# Patient Record
Sex: MALE | Race: WHITE | NOT HISPANIC OR LATINO | Employment: FULL TIME | URBAN - METROPOLITAN AREA
[De-identification: names, ages, dates, MRNs, and addresses within clinical notes are randomized per-mention and may not be internally consistent; named-entity substitution may affect disease eponyms.]

---

## 2020-02-21 ENCOUNTER — WALK IN (OUTPATIENT)
Dept: URGENT CARE | Age: 50
End: 2020-02-21

## 2020-02-21 ENCOUNTER — IMAGING SERVICES (OUTPATIENT)
Dept: GENERAL RADIOLOGY | Age: 50
End: 2020-02-21
Attending: FAMILY MEDICINE

## 2020-02-21 VITALS
TEMPERATURE: 97 F | RESPIRATION RATE: 20 BRPM | WEIGHT: 275 LBS | OXYGEN SATURATION: 97 % | DIASTOLIC BLOOD PRESSURE: 80 MMHG | HEIGHT: 70 IN | SYSTOLIC BLOOD PRESSURE: 122 MMHG | BODY MASS INDEX: 39.37 KG/M2 | HEART RATE: 77 BPM

## 2020-02-21 DIAGNOSIS — M79.671 RIGHT FOOT PAIN: ICD-10-CM

## 2020-02-21 DIAGNOSIS — S92.901A CLOSED FRACTURE OF RIGHT FOOT, INITIAL ENCOUNTER: Primary | ICD-10-CM

## 2020-02-21 PROCEDURE — 73630 X-RAY EXAM OF FOOT: CPT | Performed by: RADIOLOGY

## 2020-02-21 PROCEDURE — 99214 OFFICE O/P EST MOD 30 MIN: CPT | Performed by: FAMILY MEDICINE

## 2020-02-21 ASSESSMENT — ENCOUNTER SYMPTOMS
COLOR CHANGE: 0
NAUSEA: 0
CHILLS: 0
HEADACHES: 0
VOMITING: 0
DIZZINESS: 0
FEVER: 0
WOUND: 0

## 2020-02-24 ENCOUNTER — TELEPHONE (OUTPATIENT)
Dept: PODIATRY | Age: 50
End: 2020-02-24

## 2020-03-04 ENCOUNTER — OFFICE VISIT (OUTPATIENT)
Dept: PODIATRY | Age: 50
End: 2020-03-04
Attending: FAMILY MEDICINE

## 2020-03-04 VITALS — RESPIRATION RATE: 18 BRPM | DIASTOLIC BLOOD PRESSURE: 77 MMHG | SYSTOLIC BLOOD PRESSURE: 122 MMHG | HEART RATE: 78 BPM

## 2020-03-04 DIAGNOSIS — R26.9 ABNORMAL GAIT: ICD-10-CM

## 2020-03-04 DIAGNOSIS — S92.324G CLOSED NONDISPLACED FRACTURE OF SECOND METATARSAL BONE OF RIGHT FOOT WITH DELAYED HEALING, SUBSEQUENT ENCOUNTER: Primary | ICD-10-CM

## 2020-03-04 PROCEDURE — 99202 OFFICE O/P NEW SF 15 MIN: CPT | Performed by: PODIATRIST

## 2020-03-23 ENCOUNTER — HOME CARE DME RT ONLY (OUTPATIENT)
Dept: PODIATRY | Age: 50
End: 2020-03-23

## 2020-05-05 ENCOUNTER — NURSE TRIAGE (OUTPATIENT)
Dept: TELEHEALTH | Age: 50
End: 2020-05-05

## 2020-05-06 ENCOUNTER — WALK IN (OUTPATIENT)
Dept: URGENT CARE | Age: 50
End: 2020-05-06

## 2020-05-06 VITALS
HEIGHT: 70 IN | TEMPERATURE: 98.6 F | SYSTOLIC BLOOD PRESSURE: 136 MMHG | OXYGEN SATURATION: 97 % | WEIGHT: 276.4 LBS | BODY MASS INDEX: 39.57 KG/M2 | RESPIRATION RATE: 16 BRPM | HEART RATE: 91 BPM | DIASTOLIC BLOOD PRESSURE: 88 MMHG

## 2020-05-06 DIAGNOSIS — L03.116 CELLULITIS OF LEFT LEG: Primary | ICD-10-CM

## 2020-05-06 LAB
ALBUMIN SERPL-MCNC: 3.2 G/DL (ref 3.6–5.1)
ALBUMIN/GLOB SERPL: 0.6 {RATIO} (ref 1–2.4)
ALP SERPL-CCNC: 59 UNITS/L (ref 45–117)
ALT SERPL-CCNC: 31 UNITS/L
ANION GAP SERPL CALC-SCNC: 16 MMOL/L (ref 10–20)
AST SERPL-CCNC: 18 UNITS/L
BASOPHILS # BLD: 0 K/MCL (ref 0–0.3)
BASOPHILS NFR BLD: 0 %
BILIRUB SERPL-MCNC: 0.5 MG/DL (ref 0.2–1)
BUN SERPL-MCNC: 18 MG/DL (ref 6–20)
BUN/CREAT SERPL: 21 (ref 7–25)
CALCIUM SERPL-MCNC: 9.3 MG/DL (ref 8.4–10.2)
CHLORIDE SERPL-SCNC: 100 MMOL/L (ref 98–107)
CO2 SERPL-SCNC: 24 MMOL/L (ref 21–32)
CREAT SERPL-MCNC: 0.86 MG/DL (ref 0.67–1.17)
DEPRECATED RDW RBC: 45.4 FL (ref 39–50)
EOSINOPHIL # BLD: 0.1 K/MCL (ref 0.1–0.5)
EOSINOPHIL NFR BLD: 1 %
ERYTHROCYTE [DISTWIDTH] IN BLOOD: 14.8 % (ref 11–15)
FASTING DURATION TIME PATIENT: ABNORMAL H
GFR SERPLBLD BASED ON 1.73 SQ M-ARVRAT: >90 ML/MIN
GLOBULIN SER-MCNC: 5 G/DL (ref 2–4)
GLUCOSE SERPL-MCNC: 95 MG/DL (ref 65–99)
HCT VFR BLD CALC: 42.4 % (ref 39–51)
HGB BLD-MCNC: 14.2 G/DL (ref 13–17)
IMM GRANULOCYTES # BLD AUTO: 0.1 K/MCL (ref 0–0.2)
IMM GRANULOCYTES # BLD: 1 %
LYMPHOCYTES # BLD: 2.1 K/MCL (ref 1–4.8)
LYMPHOCYTES NFR BLD: 17 %
MCH RBC QN AUTO: 28.3 PG (ref 26–34)
MCHC RBC AUTO-ENTMCNC: 33.5 G/DL (ref 32–36.5)
MCV RBC AUTO: 84.5 FL (ref 78–100)
MONOCYTES # BLD: 1.8 K/MCL (ref 0.3–0.9)
MONOCYTES NFR BLD: 14 %
NEUTROPHILS # BLD: 8.2 K/MCL (ref 1.8–7.7)
NEUTROPHILS NFR BLD: 67 %
NRBC BLD MANUAL-RTO: 0 /100 WBC
PLATELET # BLD AUTO: 221 K/MCL (ref 140–450)
POTASSIUM SERPL-SCNC: 3.8 MMOL/L (ref 3.4–5.1)
PROT SERPL-MCNC: 8.2 G/DL (ref 6.4–8.2)
RBC # BLD: 5.02 MIL/MCL (ref 4.5–5.9)
SODIUM SERPL-SCNC: 136 MMOL/L (ref 135–145)
WBC # BLD: 12.3 K/MCL (ref 4.2–11)

## 2020-05-06 PROCEDURE — 80053 COMPREHEN METABOLIC PANEL: CPT | Performed by: INTERNAL MEDICINE

## 2020-05-06 PROCEDURE — 99214 OFFICE O/P EST MOD 30 MIN: CPT | Performed by: PHYSICIAN ASSISTANT

## 2020-05-06 PROCEDURE — 85025 COMPLETE CBC W/AUTO DIFF WBC: CPT | Performed by: INTERNAL MEDICINE

## 2020-05-06 RX ORDER — CLINDAMYCIN HYDROCHLORIDE 300 MG/1
300 CAPSULE ORAL 4 TIMES DAILY
Qty: 40 CAPSULE | Refills: 0 | Status: SHIPPED | OUTPATIENT
Start: 2020-05-06 | End: 2020-05-06 | Stop reason: SDUPTHER

## 2020-05-06 RX ORDER — CLINDAMYCIN HYDROCHLORIDE 300 MG/1
300 CAPSULE ORAL 4 TIMES DAILY
Qty: 40 CAPSULE | Refills: 0 | Status: SHIPPED | OUTPATIENT
Start: 2020-05-06 | End: 2020-05-16

## 2020-05-11 ENCOUNTER — WALK IN (OUTPATIENT)
Dept: URGENT CARE | Age: 50
End: 2020-05-11

## 2020-05-11 VITALS
TEMPERATURE: 97.6 F | WEIGHT: 276 LBS | HEART RATE: 77 BPM | RESPIRATION RATE: 18 BRPM | DIASTOLIC BLOOD PRESSURE: 70 MMHG | SYSTOLIC BLOOD PRESSURE: 108 MMHG | OXYGEN SATURATION: 97 % | BODY MASS INDEX: 39.6 KG/M2

## 2020-05-11 DIAGNOSIS — L03.116 CELLULITIS OF LEFT LOWER EXTREMITY: Primary | ICD-10-CM

## 2020-05-11 PROCEDURE — 99213 OFFICE O/P EST LOW 20 MIN: CPT | Performed by: FAMILY MEDICINE

## 2020-05-11 ASSESSMENT — ENCOUNTER SYMPTOMS
CHILLS: 0
DIZZINESS: 0
VOMITING: 0
FEVER: 0
NAUSEA: 0
HEADACHES: 0
WOUND: 0
COLOR CHANGE: 1

## 2020-05-15 ENCOUNTER — WALK IN (OUTPATIENT)
Dept: URGENT CARE | Age: 50
End: 2020-05-15

## 2020-05-15 ENCOUNTER — TELEPHONE (OUTPATIENT)
Dept: FAMILY MEDICINE | Age: 50
End: 2020-05-15

## 2020-05-15 VITALS
DIASTOLIC BLOOD PRESSURE: 70 MMHG | HEIGHT: 70 IN | TEMPERATURE: 99.1 F | BODY MASS INDEX: 39.26 KG/M2 | WEIGHT: 274.25 LBS | SYSTOLIC BLOOD PRESSURE: 110 MMHG | OXYGEN SATURATION: 97 % | HEART RATE: 84 BPM | RESPIRATION RATE: 16 BRPM

## 2020-05-15 DIAGNOSIS — L27.0 DRUG-INDUCED SKIN RASH: ICD-10-CM

## 2020-05-15 DIAGNOSIS — R21 SKIN RASH: Primary | ICD-10-CM

## 2020-05-15 PROCEDURE — 99214 OFFICE O/P EST MOD 30 MIN: CPT | Performed by: FAMILY MEDICINE

## 2020-05-15 RX ORDER — LISINOPRIL 10 MG/1
10 TABLET ORAL DAILY
COMMUNITY
End: 2020-07-07 | Stop reason: SDUPTHER

## 2020-05-15 RX ORDER — PREDNISONE 20 MG/1
TABLET ORAL
Qty: 20 TABLET | Refills: 0 | Status: SHIPPED | OUTPATIENT
Start: 2020-05-15 | End: 2020-06-03 | Stop reason: ALTCHOICE

## 2020-05-15 ASSESSMENT — ENCOUNTER SYMPTOMS
FEVER: 0
ABDOMINAL PAIN: 0
NAUSEA: 0
SHORTNESS OF BREATH: 0
HEADACHES: 0
VOMITING: 0
CHEST TIGHTNESS: 0
DIZZINESS: 0
CHILLS: 0
CONFUSION: 0

## 2020-05-19 ENCOUNTER — OFFICE VISIT (OUTPATIENT)
Dept: FAMILY MEDICINE | Age: 50
End: 2020-05-19

## 2020-05-19 VITALS
SYSTOLIC BLOOD PRESSURE: 117 MMHG | TEMPERATURE: 98.1 F | RESPIRATION RATE: 16 BRPM | HEART RATE: 75 BPM | HEIGHT: 70 IN | DIASTOLIC BLOOD PRESSURE: 80 MMHG | WEIGHT: 277 LBS | BODY MASS INDEX: 39.65 KG/M2

## 2020-05-19 DIAGNOSIS — E78.5 HYPERLIPIDEMIA, UNSPECIFIED HYPERLIPIDEMIA TYPE: ICD-10-CM

## 2020-05-19 DIAGNOSIS — I10 ESSENTIAL HYPERTENSION: ICD-10-CM

## 2020-05-19 DIAGNOSIS — Z76.89 ESTABLISHING CARE WITH NEW DOCTOR, ENCOUNTER FOR: ICD-10-CM

## 2020-05-19 DIAGNOSIS — L03.116 CELLULITIS OF LEFT LOWER EXTREMITY: Primary | ICD-10-CM

## 2020-05-19 DIAGNOSIS — Z88.9 DRUG ALLERGY: ICD-10-CM

## 2020-05-19 DIAGNOSIS — E11.9 TYPE 2 DIABETES MELLITUS WITHOUT COMPLICATION, WITHOUT LONG-TERM CURRENT USE OF INSULIN (CMD): ICD-10-CM

## 2020-05-19 PROCEDURE — 99214 OFFICE O/P EST MOD 30 MIN: CPT | Performed by: FAMILY MEDICINE

## 2020-05-19 ASSESSMENT — ENCOUNTER SYMPTOMS
ENDOCRINE NEGATIVE: 1
BACK PAIN: 0
SLEEP DISTURBANCE: 0
GASTROINTESTINAL NEGATIVE: 1
FEVER: 0
NERVOUS/ANXIOUS: 0
LIGHT-HEADEDNESS: 0
FATIGUE: 0
HEADACHES: 0
COUGH: 0
SHORTNESS OF BREATH: 0
CHILLS: 0
PHOTOPHOBIA: 0
WOUND: 1
DIZZINESS: 0

## 2020-06-03 ENCOUNTER — LAB SERVICES (OUTPATIENT)
Dept: LAB | Age: 50
End: 2020-06-03

## 2020-06-03 ENCOUNTER — TELEPHONE (OUTPATIENT)
Dept: FAMILY MEDICINE | Age: 50
End: 2020-06-03

## 2020-06-03 ENCOUNTER — OFFICE VISIT (OUTPATIENT)
Dept: FAMILY MEDICINE | Age: 50
End: 2020-06-03

## 2020-06-03 VITALS
OXYGEN SATURATION: 99 % | DIASTOLIC BLOOD PRESSURE: 70 MMHG | SYSTOLIC BLOOD PRESSURE: 98 MMHG | RESPIRATION RATE: 16 BRPM | HEIGHT: 70 IN | WEIGHT: 276.9 LBS | HEART RATE: 74 BPM | BODY MASS INDEX: 39.64 KG/M2

## 2020-06-03 DIAGNOSIS — L03.116 CELLULITIS OF LEFT LOWER EXTREMITY: Primary | ICD-10-CM

## 2020-06-03 DIAGNOSIS — E11.9 TYPE 2 DIABETES MELLITUS WITHOUT COMPLICATION, WITHOUT LONG-TERM CURRENT USE OF INSULIN (CMD): ICD-10-CM

## 2020-06-03 PROCEDURE — 80061 LIPID PANEL: CPT | Performed by: INTERNAL MEDICINE

## 2020-06-03 PROCEDURE — 99213 OFFICE O/P EST LOW 20 MIN: CPT | Performed by: FAMILY MEDICINE

## 2020-06-03 PROCEDURE — 82570 ASSAY OF URINE CREATININE: CPT | Performed by: INTERNAL MEDICINE

## 2020-06-03 PROCEDURE — 83036 HEMOGLOBIN GLYCOSYLATED A1C: CPT | Performed by: INTERNAL MEDICINE

## 2020-06-03 PROCEDURE — 80053 COMPREHEN METABOLIC PANEL: CPT | Performed by: INTERNAL MEDICINE

## 2020-06-03 PROCEDURE — 82043 UR ALBUMIN QUANTITATIVE: CPT | Performed by: INTERNAL MEDICINE

## 2020-06-03 RX ORDER — CEPHALEXIN 750 MG/1
750 CAPSULE ORAL 3 TIMES DAILY
Qty: 30 CAPSULE | Refills: 0 | Status: SHIPPED | OUTPATIENT
Start: 2020-06-03 | End: 2020-06-13

## 2020-06-03 ASSESSMENT — PATIENT HEALTH QUESTIONNAIRE - PHQ9
CLINICAL INTERPRETATION OF PHQ9 SCORE: NO FURTHER SCREENING NEEDED
2. FEELING DOWN, DEPRESSED OR HOPELESS: NOT AT ALL
SUM OF ALL RESPONSES TO PHQ9 QUESTIONS 1 AND 2: 0
1. LITTLE INTEREST OR PLEASURE IN DOING THINGS: NOT AT ALL
CLINICAL INTERPRETATION OF PHQ2 SCORE: NO FURTHER SCREENING NEEDED
SUM OF ALL RESPONSES TO PHQ9 QUESTIONS 1 AND 2: 0

## 2020-06-04 ENCOUNTER — TELEPHONE (OUTPATIENT)
Dept: FAMILY MEDICINE | Age: 50
End: 2020-06-04

## 2020-06-04 LAB
ALBUMIN SERPL-MCNC: 3.3 G/DL (ref 3.6–5.1)
ALBUMIN/GLOB SERPL: 0.9 {RATIO} (ref 1–2.4)
ALP SERPL-CCNC: 56 UNITS/L (ref 45–117)
ALT SERPL-CCNC: 35 UNITS/L
ANION GAP SERPL CALC-SCNC: 12 MMOL/L (ref 10–20)
AST SERPL-CCNC: 12 UNITS/L
BILIRUB SERPL-MCNC: 0.4 MG/DL (ref 0.2–1)
BUN SERPL-MCNC: 13 MG/DL (ref 6–20)
BUN/CREAT SERPL: 18 (ref 7–25)
CALCIUM SERPL-MCNC: 8.8 MG/DL (ref 8.4–10.2)
CHLORIDE SERPL-SCNC: 110 MMOL/L (ref 98–107)
CHOLEST SERPL-MCNC: 213 MG/DL
CHOLEST/HDLC SERPL: 5.6 {RATIO}
CO2 SERPL-SCNC: 21 MMOL/L (ref 21–32)
CREAT SERPL-MCNC: 0.73 MG/DL (ref 0.67–1.17)
CREAT UR-MCNC: 148 MG/DL
FASTING DURATION TIME PATIENT: 10 HOURS
FASTING DURATION TIME PATIENT: 10 HOURS
GFR SERPLBLD BASED ON 1.73 SQ M-ARVRAT: >90 ML/MIN
GLOBULIN SER-MCNC: 3.6 G/DL (ref 2–4)
GLUCOSE SERPL-MCNC: 97 MG/DL (ref 65–99)
HBA1C MFR BLD: 5.9 % (ref 4.5–5.6)
HDLC SERPL-MCNC: 38 MG/DL
LDLC SERPL CALC-MCNC: 120 MG/DL
MICROALBUMIN UR-MCNC: 0.79 MCG/MIN
MICROALBUMIN/CREAT UR: 5.3 MG/G
NONHDLC SERPL-MCNC: 175 MG/DL
POTASSIUM SERPL-SCNC: 4.1 MMOL/L (ref 3.4–5.1)
PROT SERPL-MCNC: 6.9 G/DL (ref 6.4–8.2)
SODIUM SERPL-SCNC: 139 MMOL/L (ref 135–145)
TRIGL SERPL-MCNC: 275 MG/DL

## 2020-06-04 ASSESSMENT — ENCOUNTER SYMPTOMS
ROS SKIN COMMENTS: SEE HPI
RESPIRATORY NEGATIVE: 1
CONSTITUTIONAL NEGATIVE: 1

## 2020-06-08 RX ORDER — OMEGA-3 FATTY ACIDS/FISH OIL 300-1000MG
1000 CAPSULE ORAL 2 TIMES DAILY
Qty: 1 CAPSULE | Refills: 0 | Status: SHIPPED | COMMUNITY
Start: 2020-06-08

## 2020-07-06 ENCOUNTER — TELEPHONE (OUTPATIENT)
Dept: FAMILY MEDICINE | Age: 50
End: 2020-07-06

## 2020-07-06 ENCOUNTER — OFFICE VISIT (OUTPATIENT)
Dept: FAMILY MEDICINE | Age: 50
End: 2020-07-06

## 2020-07-06 VITALS
WEIGHT: 284 LBS | HEART RATE: 73 BPM | DIASTOLIC BLOOD PRESSURE: 80 MMHG | HEIGHT: 70 IN | BODY MASS INDEX: 40.66 KG/M2 | SYSTOLIC BLOOD PRESSURE: 130 MMHG | OXYGEN SATURATION: 97 % | RESPIRATION RATE: 17 BRPM

## 2020-07-06 DIAGNOSIS — R23.8 REDNESS AND SWELLING OF LOWER LEG: ICD-10-CM

## 2020-07-06 DIAGNOSIS — M79.89 REDNESS AND SWELLING OF LOWER LEG: ICD-10-CM

## 2020-07-06 DIAGNOSIS — E11.9 TYPE 2 DIABETES MELLITUS WITHOUT COMPLICATION, WITHOUT LONG-TERM CURRENT USE OF INSULIN (CMD): ICD-10-CM

## 2020-07-06 DIAGNOSIS — I10 ESSENTIAL HYPERTENSION: ICD-10-CM

## 2020-07-06 DIAGNOSIS — L03.116 CELLULITIS OF LEFT LOWER EXTREMITY: Primary | ICD-10-CM

## 2020-07-06 DIAGNOSIS — R60.0 LEG EDEMA, LEFT: ICD-10-CM

## 2020-07-06 PROCEDURE — 99214 OFFICE O/P EST MOD 30 MIN: CPT | Performed by: FAMILY MEDICINE

## 2020-07-06 RX ORDER — AMOXICILLIN AND CLAVULANATE POTASSIUM 875; 125 MG/1; MG/1
1 TABLET, FILM COATED ORAL EVERY 12 HOURS
Qty: 28 TABLET | Refills: 0 | Status: SHIPPED | OUTPATIENT
Start: 2020-07-06 | End: 2020-07-23 | Stop reason: SDUPTHER

## 2020-07-06 RX ORDER — FUROSEMIDE 20 MG/1
20 TABLET ORAL DAILY
Qty: 14 TABLET | Refills: 1 | Status: SHIPPED | OUTPATIENT
Start: 2020-07-06 | End: 2020-08-06 | Stop reason: ALTCHOICE

## 2020-07-07 RX ORDER — LISINOPRIL 10 MG/1
10 TABLET ORAL DAILY
Qty: 30 TABLET | Refills: 5 | Status: SHIPPED | OUTPATIENT
Start: 2020-07-07

## 2020-07-07 ASSESSMENT — ENCOUNTER SYMPTOMS
DIZZINESS: 0
CHILLS: 0
PHOTOPHOBIA: 0
FATIGUE: 0
HEADACHES: 0
POLYPHAGIA: 0
RESPIRATORY NEGATIVE: 1
FEVER: 0
PSYCHIATRIC NEGATIVE: 1
COLOR CHANGE: 1
BACK PAIN: 0
GASTROINTESTINAL NEGATIVE: 1
LIGHT-HEADEDNESS: 0
POLYDIPSIA: 0

## 2020-07-23 ENCOUNTER — TELEPHONE (OUTPATIENT)
Dept: FAMILY MEDICINE | Age: 50
End: 2020-07-23

## 2020-07-23 DIAGNOSIS — M79.89 REDNESS AND SWELLING OF LOWER LEG: ICD-10-CM

## 2020-07-23 DIAGNOSIS — R23.8 REDNESS AND SWELLING OF LOWER LEG: ICD-10-CM

## 2020-07-23 DIAGNOSIS — L03.116 CELLULITIS OF LEFT LOWER EXTREMITY: Primary | ICD-10-CM

## 2020-07-23 RX ORDER — AMOXICILLIN AND CLAVULANATE POTASSIUM 875; 125 MG/1; MG/1
1 TABLET, FILM COATED ORAL EVERY 12 HOURS
Qty: 28 TABLET | Refills: 0 | Status: SHIPPED | OUTPATIENT
Start: 2020-07-23 | End: 2020-08-06

## 2020-08-06 ENCOUNTER — OFFICE VISIT (OUTPATIENT)
Dept: FAMILY MEDICINE | Age: 50
End: 2020-08-06

## 2020-08-06 VITALS
DIASTOLIC BLOOD PRESSURE: 80 MMHG | BODY MASS INDEX: 39.65 KG/M2 | RESPIRATION RATE: 16 BRPM | WEIGHT: 277 LBS | HEIGHT: 70 IN | SYSTOLIC BLOOD PRESSURE: 122 MMHG | HEART RATE: 79 BPM

## 2020-08-06 DIAGNOSIS — L03.116 CELLULITIS OF LEFT LOWER EXTREMITY: ICD-10-CM

## 2020-08-06 DIAGNOSIS — R60.0 LEG EDEMA, LEFT: Primary | ICD-10-CM

## 2020-08-06 DIAGNOSIS — M79.89 REDNESS AND SWELLING OF LOWER LEG: ICD-10-CM

## 2020-08-06 DIAGNOSIS — I87.2 VENOUS STASIS DERMATITIS OF LEFT LOWER EXTREMITY: ICD-10-CM

## 2020-08-06 DIAGNOSIS — R23.8 REDNESS AND SWELLING OF LOWER LEG: ICD-10-CM

## 2020-08-06 PROCEDURE — 99213 OFFICE O/P EST LOW 20 MIN: CPT | Performed by: FAMILY MEDICINE

## 2020-08-06 RX ORDER — POTASSIUM CHLORIDE 750 MG/1
10 TABLET, FILM COATED, EXTENDED RELEASE ORAL 2 TIMES DAILY
Qty: 30 TABLET | Refills: 1 | Status: SHIPPED | OUTPATIENT
Start: 2020-08-06

## 2020-08-06 RX ORDER — FUROSEMIDE 20 MG/1
20 TABLET ORAL DAILY
Qty: 30 TABLET | Refills: 1 | Status: SHIPPED | OUTPATIENT
Start: 2020-08-06

## 2020-08-07 ASSESSMENT — ENCOUNTER SYMPTOMS
HEADACHES: 0
GASTROINTESTINAL NEGATIVE: 1
NUMBNESS: 0
COLOR CHANGE: 1
ROS SKIN COMMENTS: SEE HPI
CONSTITUTIONAL NEGATIVE: 1
RESPIRATORY NEGATIVE: 1
DIZZINESS: 0

## 2020-08-12 ENCOUNTER — TELEPHONE (OUTPATIENT)
Dept: DERMATOLOGY | Age: 50
End: 2020-08-12

## 2020-08-13 ENCOUNTER — OFFICE VISIT (OUTPATIENT)
Dept: DERMATOLOGY | Age: 50
End: 2020-08-13

## 2020-08-13 DIAGNOSIS — R21 RASH: Primary | ICD-10-CM

## 2020-08-13 PROCEDURE — 99202 OFFICE O/P NEW SF 15 MIN: CPT | Performed by: DERMATOLOGY

## 2020-08-13 RX ORDER — TRIAMCINOLONE ACETONIDE 1 MG/G
CREAM TOPICAL
Qty: 80 G | Refills: 0 | Status: SHIPPED | OUTPATIENT
Start: 2020-08-13

## 2021-02-18 ENCOUNTER — TELEPHONE (OUTPATIENT)
Dept: FAMILY MEDICINE | Age: 51
End: 2021-02-18

## 2023-07-10 ENCOUNTER — OFFICE VISIT (OUTPATIENT)
Dept: URBAN - METROPOLITAN AREA CLINIC 1 | Facility: CLINIC | Age: 53
End: 2023-07-10
Payer: COMMERCIAL

## 2023-07-10 DIAGNOSIS — Z96.1 PRESENCE OF INTRAOCULAR LENS: ICD-10-CM

## 2023-07-10 DIAGNOSIS — H25.811 COMBINED FORMS OF AGE-RELATED CATARACT, RIGHT EYE: Primary | ICD-10-CM

## 2023-07-10 DIAGNOSIS — E11.9 DIABETES MELLITUS TYPE 2 WITHOUT MENTION OF COMPLICATION: ICD-10-CM

## 2023-07-10 DIAGNOSIS — H59.032 CYSTOID MACULAR EDEMA FOLLOWING CATARACT SURGERY, LEFT EYE: ICD-10-CM

## 2023-07-10 DIAGNOSIS — H04.123 TEAR FILM INSUFFICIENCY OF BILATERAL LACRIMAL GLANDS: ICD-10-CM

## 2023-07-10 DIAGNOSIS — H43.392 OTHER VITREOUS OPACITIES, LEFT EYE: ICD-10-CM

## 2023-07-10 PROCEDURE — 92134 CPTRZ OPH DX IMG PST SGM RTA: CPT

## 2023-07-10 PROCEDURE — 99204 OFFICE O/P NEW MOD 45 MIN: CPT

## 2023-07-10 ASSESSMENT — INTRAOCULAR PRESSURE
OD: 16
OS: 18

## 2023-07-10 NOTE — IMPRESSION/PLAN
Impression: Cystoid macular edema following cataract surgery, left eye: H59.032. Plan: OS: 
- Patient had cataract surgery OS in [facility] 2017. - Amsler grid done in office and patient notices metamorphopsia OS. -OCT MAC shows edema OS, patient will be referred to [facility].

## 2023-07-10 NOTE — IMPRESSION/PLAN
Impression: Presence of intraocular lens: Z96.1. Plan: OS:
-Advised patient concerning the signs and symptoms of retinal detachment, including flashes, floater, and dark veil. 
-Return to the clinic or call immediately if any issues arise

## 2023-07-10 NOTE — IMPRESSION/PLAN
Impression: Other vitreous opacities, left eye: H43.392. Plan: -Educated patient on signs and symptoms of possible retinal detachment (flashes, floaters, change in peripheral vision, etc). -Advised to contact us immediately for any of these or other new symptoms.

## 2023-07-10 NOTE — IMPRESSION/PLAN
Impression: Tear film insufficiency of bilateral lacrimal glands: H04.123.  Plan: -Rx'd PFAT QID OU or PRN
-Recommended warm compresses BID x 10 minutes OU
-Educated patient that UZMA is a chronic multi-factorial disease (genetics, systemic dz, environment, allergies)

## 2023-07-10 NOTE — IMPRESSION/PLAN
Impression: Diabetes mellitus Type 2 without mention of complication: X20.6. Plan: Diabetes Mellitus Type II without signs of diabetic retinopathy either eye - 
-Discussed the pathophysiology of diabetes and its effect on the eye.  
-Stressed the importance of strong glucose control. Advised of importance of at least annual dilated examinations, and to contact us immediately for any problems or concerns.

## 2023-07-10 NOTE — IMPRESSION/PLAN
Impression: Combined forms of age-related cataract, right eye: H25.811. Plan: -Continue to monitor at follow ups and annual exams.
-Pt denies any visual or ocular symptoms  
-The patient was advised to contact us if any change or worsening of vision.